# Patient Record
Sex: FEMALE | Race: WHITE | Employment: FULL TIME | URBAN - NONMETROPOLITAN AREA
[De-identification: names, ages, dates, MRNs, and addresses within clinical notes are randomized per-mention and may not be internally consistent; named-entity substitution may affect disease eponyms.]

---

## 2020-12-17 ENCOUNTER — TELEPHONE (OUTPATIENT)
Dept: OBGYN CLINIC | Age: 40
End: 2020-12-17

## 2020-12-17 ENCOUNTER — OFFICE VISIT (OUTPATIENT)
Dept: OBGYN CLINIC | Age: 40
End: 2020-12-17
Payer: COMMERCIAL

## 2020-12-17 ENCOUNTER — HOSPITAL ENCOUNTER (OUTPATIENT)
Age: 40
Setting detail: SPECIMEN
Discharge: HOME | End: 2020-12-17
Payer: COMMERCIAL

## 2020-12-17 VITALS
HEIGHT: 70 IN | DIASTOLIC BLOOD PRESSURE: 84 MMHG | WEIGHT: 220 LBS | BODY MASS INDEX: 31.5 KG/M2 | SYSTOLIC BLOOD PRESSURE: 126 MMHG

## 2020-12-17 LAB
ESTIMATED AVERAGE GLUCOSE: 105 MG/DL
HBA1C MFR BLD: 5.3 % (ref 4–6)
SEX HORMONE BINDING GLOBULIN: 60 NMOL/L (ref 30–135)
TESTOSTERONE TOTAL: 21 NG/DL (ref 20–70)
TSH SERPL DL<=0.05 MIU/L-ACNC: 1.74 MIU/L (ref 0.3–5)
VITAMIN B-12: 745 PG/ML (ref 232–1245)
VITAMIN D 25-HYDROXY: 32.1 NG/ML (ref 30–100)

## 2020-12-17 PROCEDURE — G8484 FLU IMMUNIZE NO ADMIN: HCPCS | Performed by: ADVANCED PRACTICE MIDWIFE

## 2020-12-17 PROCEDURE — 99213 OFFICE O/P EST LOW 20 MIN: CPT | Performed by: ADVANCED PRACTICE MIDWIFE

## 2020-12-17 PROCEDURE — 4004F PT TOBACCO SCREEN RCVD TLK: CPT | Performed by: ADVANCED PRACTICE MIDWIFE

## 2020-12-17 PROCEDURE — G8427 DOCREV CUR MEDS BY ELIG CLIN: HCPCS | Performed by: ADVANCED PRACTICE MIDWIFE

## 2020-12-17 PROCEDURE — G8419 CALC BMI OUT NRM PARAM NOF/U: HCPCS | Performed by: ADVANCED PRACTICE MIDWIFE

## 2020-12-17 RX ORDER — LEVOTHYROXINE SODIUM 88 UG/1
88 TABLET ORAL DAILY
COMMUNITY

## 2020-12-17 RX ORDER — SULFAMETHOXAZOLE AND TRIMETHOPRIM 800; 160 MG/1; MG/1
1 TABLET ORAL 2 TIMES DAILY
Qty: 6 TABLET | Refills: 0 | Status: SHIPPED | OUTPATIENT
Start: 2020-12-17 | End: 2020-12-20

## 2020-12-17 RX ORDER — CITALOPRAM 40 MG/1
40 TABLET ORAL DAILY
COMMUNITY

## 2020-12-17 SDOH — HEALTH STABILITY: MENTAL HEALTH: HOW OFTEN DO YOU HAVE A DRINK CONTAINING ALCOHOL?: NEVER

## 2020-12-17 ASSESSMENT — ENCOUNTER SYMPTOMS
RESPIRATORY NEGATIVE: 1
GASTROINTESTINAL NEGATIVE: 1

## 2020-12-17 NOTE — PROGRESS NOTES
PROBLEM VISIT     Date of service: 2020    Jayda Sheffield  Is a 47 y.o.  female    PT's PCP is: DAVID Quinn - CNP     : 1980                                          Review of Systems   Constitutional: Positive for fatigue. Right breast/nipple pain     HENT: Negative. Respiratory: Negative. Cardiovascular: Negative. Gastrointestinal: Negative. Endocrine:        Recent changes to synthroid     Genitourinary: Negative for pelvic pain. Decreased libido     Musculoskeletal: Negative. Neurological: Negative. Psychiatric/Behavioral: Positive for dysphoric mood. Started on Celexa by PCP in 2020          No LMP recorded (lmp unknown). Patient has had an ablation. OB History    Para Term  AB Living   4 2 2   2 2   SAB TAB Ectopic Molar Multiple Live Births   2         2      # Outcome Date GA Lbr Jose R/2nd Weight Sex Delivery Anes PTL Lv   4 Term  40w0d    Vag-Spont   MARIBEL   3 SAB            2 Term  40w0d    Vag-Spont   MARIBEL   1 SAB               Birth Comments: D/C        Social History     Tobacco Use   Smoking Status Current Every Day Smoker   Smokeless Tobacco Never Used        Social History     Substance and Sexual Activity   Alcohol Use Never    Frequency: Never       Allergies: Codeine and Penicillins      Current Outpatient Medications:     citalopram (CELEXA) 40 MG tablet, Take 40 mg by mouth daily, Disp: , Rfl:     Multiple Vitamins-Minerals (MULTIVITAMIN ADULT PO), Take by mouth, Disp: , Rfl:     levothyroxine (SYNTHROID) 88 MCG tablet, Take 88 mcg by mouth daily, Disp: , Rfl:     Social History     Substance and Sexual Activity   Sexual Activity Not on file       Chief Complaint   Patient presents with    Irregular Menses     States had an ablation 3/2020. Has not had any periods , only light spotting for a day or two. Since then though she has had decreased libido, no sec drive, feeling weird like hormones are off. Mentions soreness in the right breast.     Depression     Had foot surgery this June, got started on Celexa in Sept. Had a lot of complications from the surgery which made her depressed. Physical Exam  Constitutional:       Appearance: Normal appearance. She is normal weight. HENT:      Head: Normocephalic. Eyes:      Pupils: Pupils are equal, round, and reactive to light. Neck:      Musculoskeletal: Normal range of motion. Pulmonary:      Effort: Pulmonary effort is normal.   Chest:      Breasts:         Right: Tenderness present. No inverted nipple, mass or nipple discharge. Left: Normal.      Comments: Right breast is tender and reports nipple discharge in shower - none noted today. Does have dense breast tissue bilaterally. Musculoskeletal: Normal range of motion. Neurological:      Mental Status: She is alert and oriented to person, place, and time. Skin:     General: Skin is warm and dry. Psychiatric:         Attention and Perception: Attention normal.         Mood and Affect: Mood normal.         Speech: Speech normal.         Behavior: Behavior normal. Behavior is cooperative. Thought Content: Thought content normal.         Cognition and Memory: Cognition normal.   Vitals signs and nursing note reviewed. Vital Signs  Blood pressure 126/84, height 5' 10\" (1.778 m), weight 220 lb (99.8 kg). HPI Right breast tenderness and intermittent nipple discharge - onset \"comes and goes for a little while\". Increased mood changes - started Celexa. Decreased libido since June 2020 - would like hormones checked. Recent changes to synthroid. Assessment and Plan          Diagnosis Orders   1. Mastodynia of right breast  ALIYA DIAGNOSTIC W OR WO CAD BILATERAL   2. Decreased libido  Testosterone    Sex Hormone Binding Globulin   3. Screening for endocrine disorder  Hemoglobin A1C    TSH with Reflex   4.  Fatigue, unspecified type  Vitamin D 25 Hydroxy    Vitamin B12       Discussed start abx course due to possible infection in right breast.  Discussed imaging - patient agreeable. Discussed reduction in tobacco and caffeine. Discussed w/u for decreased libido although recent onset in June when had ankle surgery and has had a lot of issues healing since. Also normal side effect of celexa. Patient is agreeable. I am having Joselin Vidal. Holly Steele maintain her levothyroxine, citalopram, and Multiple Vitamins-Minerals (MULTIVITAMIN ADULT PO). Return if symptoms worsen or fail to improve, for Yearly. There are no Patient Instructions on file for this visit. Over 50% of time spent on counseling and care coordination on: see assessment and plan,  She was also counseled on her preventative health maintenance recommendations and follow-up. FF time: 15 min      KEVIN CASTLE,12/17/2020 12:03 PM                          Results reviewed today:    No results found for this visit on 12/17/20.       Electronically signed by [facility] DAVID Mccann CNM

## 2020-12-17 NOTE — TELEPHONE ENCOUNTER
Please get patient scheduled for diagnostic mammogram at [facility].   I gave her a copy of order - signed - as well

## 2020-12-22 NOTE — PROGRESS NOTES
[employer] called and order for Mamm didn't have additional notes requesting [facility] if needed so sent the additional orders and BR signed.